# Patient Record
Sex: FEMALE | Race: WHITE
[De-identification: names, ages, dates, MRNs, and addresses within clinical notes are randomized per-mention and may not be internally consistent; named-entity substitution may affect disease eponyms.]

---

## 2017-12-16 ENCOUNTER — HOSPITAL ENCOUNTER (EMERGENCY)
Age: 37
Discharge: HOME | End: 2017-12-16
Payer: COMMERCIAL

## 2017-12-16 DIAGNOSIS — M54.30: ICD-10-CM

## 2017-12-16 DIAGNOSIS — Z88.0: ICD-10-CM

## 2017-12-16 DIAGNOSIS — J01.10: Primary | ICD-10-CM

## 2017-12-16 DIAGNOSIS — Z79.899: ICD-10-CM

## 2017-12-16 PROCEDURE — 99201: CPT

## 2018-06-21 ENCOUNTER — HOSPITAL ENCOUNTER (OUTPATIENT)
Age: 38
End: 2018-06-21
Payer: COMMERCIAL

## 2018-06-21 DIAGNOSIS — S69.92XA: Primary | ICD-10-CM

## 2018-06-21 PROCEDURE — 73130 X-RAY EXAM OF HAND: CPT

## 2018-07-09 ENCOUNTER — HOSPITAL ENCOUNTER (OUTPATIENT)
Age: 38
End: 2018-07-09
Payer: COMMERCIAL

## 2018-07-09 VITALS
RESPIRATION RATE: 18 BRPM | DIASTOLIC BLOOD PRESSURE: 68 MMHG | OXYGEN SATURATION: 98 % | HEART RATE: 74 BPM | SYSTOLIC BLOOD PRESSURE: 110 MMHG

## 2018-07-09 DIAGNOSIS — G57.00: ICD-10-CM

## 2018-07-09 DIAGNOSIS — M46.1: Primary | ICD-10-CM

## 2018-07-09 PROCEDURE — 99202 OFFICE O/P NEW SF 15 MIN: CPT

## 2018-10-15 ENCOUNTER — HOSPITAL ENCOUNTER (OUTPATIENT)
Age: 38
End: 2018-10-15
Payer: COMMERCIAL

## 2018-10-15 VITALS
OXYGEN SATURATION: 98 % | DIASTOLIC BLOOD PRESSURE: 62 MMHG | HEART RATE: 70 BPM | SYSTOLIC BLOOD PRESSURE: 106 MMHG | RESPIRATION RATE: 18 BRPM

## 2018-10-15 VITALS — BODY MASS INDEX: 21.6 KG/M2

## 2018-10-15 DIAGNOSIS — G57.00: ICD-10-CM

## 2018-10-15 DIAGNOSIS — M46.1: Primary | ICD-10-CM

## 2018-10-15 DIAGNOSIS — M79.10: ICD-10-CM

## 2018-10-15 PROCEDURE — 99213 OFFICE O/P EST LOW 20 MIN: CPT

## 2019-03-02 ENCOUNTER — OFFICE VISIT (OUTPATIENT)
Dept: RETAIL CLINIC | Facility: CLINIC | Age: 39
End: 2019-03-02

## 2019-03-02 VITALS
BODY MASS INDEX: 20.73 KG/M2 | SYSTOLIC BLOOD PRESSURE: 122 MMHG | DIASTOLIC BLOOD PRESSURE: 68 MMHG | HEIGHT: 68 IN | WEIGHT: 136.8 LBS | HEART RATE: 77 BPM | TEMPERATURE: 98.3 F | OXYGEN SATURATION: 98 % | RESPIRATION RATE: 20 BRPM

## 2019-03-02 DIAGNOSIS — H65.91 RIGHT OTITIS MEDIA WITH EFFUSION: Primary | ICD-10-CM

## 2019-03-02 DIAGNOSIS — H60.501 ACUTE OTITIS EXTERNA OF RIGHT EAR, UNSPECIFIED TYPE: ICD-10-CM

## 2019-03-02 DIAGNOSIS — J02.9 SORE THROAT: ICD-10-CM

## 2019-03-02 LAB
EXPIRATION DATE: NORMAL
INTERNAL CONTROL: NORMAL
Lab: NORMAL
S PYO AG THROAT QL: NEGATIVE

## 2019-03-02 PROCEDURE — 99203 OFFICE O/P NEW LOW 30 MIN: CPT | Performed by: NURSE PRACTITIONER

## 2019-03-02 PROCEDURE — 87880 STREP A ASSAY W/OPTIC: CPT | Performed by: NURSE PRACTITIONER

## 2019-03-02 RX ORDER — IBUPROFEN 200 MG
200 TABLET ORAL EVERY 6 HOURS PRN
COMMUNITY

## 2019-03-02 RX ORDER — FLUCONAZOLE 100 MG/1
100 TABLET ORAL DAILY
Qty: 2 TABLET | Refills: 0 | Status: SHIPPED | OUTPATIENT
Start: 2019-03-02 | End: 2019-03-04

## 2019-03-02 RX ORDER — CEFDINIR 300 MG/1
300 CAPSULE ORAL 2 TIMES DAILY
Qty: 20 CAPSULE | Refills: 0 | Status: SHIPPED | OUTPATIENT
Start: 2019-03-02 | End: 2019-03-12

## 2019-03-02 RX ORDER — OFLOXACIN 3 MG/ML
1 SOLUTION/ DROPS OPHTHALMIC 4 TIMES DAILY
Qty: 1 ML | Refills: 0 | Status: SHIPPED | OUTPATIENT
Start: 2019-03-02 | End: 2019-03-07

## 2019-03-02 NOTE — PROGRESS NOTES
ROSSI@  Tierney Clements is a 38 y.o. female.   Chief Complaint   Patient presents with   • Sore Throat      Tierney has had an earache & a feeling that her ear is full of wax for over a week.  In the past 2 days, she has developed a sore throat. She has had recurrent strep throat in the past that was resistant to azithromycin treatment.        Earache    There is pain in the right ear. This is a new problem. The problem occurs constantly. The problem has been gradually worsening. There has been no fever. Associated symptoms include headaches and a sore throat. Pertinent negatives include no abdominal pain, coughing, diarrhea, hearing loss, neck pain, rash, rhinorrhea or vomiting. Treatments tried: ibuprofen, sweet oil. The treatment provided no relief.        The following portions of the patient's history were reviewed and updated as appropriate: allergies, current medications, past family history, past medical history, past social history, past surgical history and problem list.    Current Outpatient Medications:   •  ibuprofen (ADVIL,MOTRIN) 200 MG tablet, Take 200 mg by mouth Every 6 (Six) Hours As Needed for Mild Pain ., Disp: , Rfl:   •  cefdinir (OMNICEF) 300 MG capsule, Take 1 capsule by mouth 2 (Two) Times a Day for 10 days., Disp: 20 capsule, Rfl: 0  •  fluconazole (DIFLUCAN) 100 MG tablet, Take 1 tablet by mouth Daily for 2 doses. Take 1st dose with 1 antibiotic, 2nd dose if symptoms persist after 72 hours, Disp: 2 tablet, Rfl: 0  •  ofloxacin (OCUFLOX) 0.3 %, Administer 1 drop to the right ear 4 (Four) Times a Day for 5 days., Disp: 1 mL, Rfl: 0    Allergies   Allergen Reactions   • Penicillins Rash       Review of Systems   Constitutional: Negative for appetite change, chills, fatigue and fever.   HENT: Positive for ear pain, sore throat and tinnitus. Negative for congestion, hearing loss, rhinorrhea, sinus pressure and sneezing.    Eyes: Negative for redness and itching.   Respiratory:  "Negative for cough and wheezing.    Cardiovascular: Negative for palpitations.   Gastrointestinal: Negative for abdominal pain, diarrhea, nausea and vomiting.   Musculoskeletal: Negative for myalgias and neck pain.   Skin: Negative for rash.   Neurological: Positive for headaches. Negative for dizziness.       Objective     Visit Vitals  /68   Pulse 77   Temp 98.3 °F (36.8 °C) (Oral)   Resp 20   Ht 172.7 cm (68\")   Wt 62.1 kg (136 lb 12.8 oz)   LMP 02/15/2019   SpO2 98%   BMI 20.80 kg/m²         Physical Exam   Constitutional: She is oriented to person, place, and time. She appears well-developed and well-nourished. She does not appear ill. No distress.   HENT:   Head: Normocephalic.   Right Ear: External ear normal. There is drainage and tenderness. Tympanic membrane is erythematous and bulging. A middle ear effusion is present.   Left Ear: Tympanic membrane, external ear and ear canal normal.   Nose: Nose normal.   Mouth/Throat: Uvula is midline and mucous membranes are normal. Posterior oropharyngeal erythema present. Tonsils are 0 on the right. Tonsils are 0 on the left. No tonsillar exudate.   Right ear canal erythematous with creamy drainage evident   Eyes: Conjunctivae are normal.   Cardiovascular: Normal rate, regular rhythm and normal heart sounds.   Pulmonary/Chest: Effort normal and breath sounds normal.   Lymphadenopathy:     She has no cervical adenopathy.   Neurological: She is alert and oriented to person, place, and time.       Lab Results (last 24 hours)     Procedure Component Value Units Date/Time    POC Rapid Strep A [[id number]]  (Normal) Collected:  03/02/19 1614    Specimen:  Swab Updated:  03/02/19 1615     Rapid Strep A Screen Negative     Internal Control Passed     Lot Number [id number]     Expiration Date 08/31/20          Assessment/Plan   Tierney was seen today for sore throat.    Diagnoses and all orders for this visit:    Right otitis media with effusion  -     cefdinir (OMNICEF) " 300 MG capsule; Take 1 capsule by mouth 2 (Two) Times a Day for 10 days.  - Drink plenty of clear, decaffeinated fluids, as tolerated.  - Acetaminophen or ibuprofen, per package directions, as needed for earache, sore throat, fever > 100, headache, mild pain  - Warm compress to ear(s) as needed for earache    Sore throat  -     POC Rapid Strep A  - Warm salt water gargle as needed for sore throat    Acute otitis externa of right ear, unspecified type  -     ofloxacin (OCUFLOX) 0.3 %; Administer 1 drop to the right ear 4 (Four) Times a Day for 5 days.    Other orders  -     fluconazole (DIFLUCAN) 100 MG tablet; Take 1 tablet by mouth Daily for 2 doses. Take 1st dose with 1 antibiotic, 2nd dose if symptoms persist after 72 hours    An After Visit Summary was reviewed, printed and given to the patient.  Understanding verbalized and patient agreed with treatment plan.  If symptom(s) worsen or fail to improve, return to clinic, follow up with PCP or go to [facility].

## 2019-08-02 ENCOUNTER — OFFICE (OUTPATIENT)
Dept: URBAN - METROPOLITAN AREA CLINIC 4 | Facility: CLINIC | Age: 39
End: 2019-08-02

## 2019-08-02 VITALS — DIASTOLIC BLOOD PRESSURE: 69 MMHG | SYSTOLIC BLOOD PRESSURE: 107 MMHG | HEIGHT: 68 IN | WEIGHT: 149 LBS

## 2019-08-02 DIAGNOSIS — R14.3 FLATULENCE: ICD-10-CM

## 2019-08-02 DIAGNOSIS — R11.0 NAUSEA: ICD-10-CM

## 2019-08-02 DIAGNOSIS — R19.4 CHANGE IN BOWEL HABIT: ICD-10-CM

## 2019-08-02 DIAGNOSIS — K30 FUNCTIONAL DYSPEPSIA: ICD-10-CM

## 2019-08-02 PROCEDURE — 99242 OFF/OP CONSLTJ NEW/EST SF 20: CPT | Performed by: NURSE PRACTITIONER

## 2021-06-24 ENCOUNTER — TRANSCRIBE ORDERS (OUTPATIENT)
Dept: ADMINISTRATIVE | Facility: HOSPITAL | Age: 41
End: 2021-06-24

## 2021-06-24 DIAGNOSIS — N80.30 ENDOMETRIOSIS OF PELVIC PERITONEUM: Primary | ICD-10-CM

## 2021-07-01 ENCOUNTER — HOSPITAL ENCOUNTER (OUTPATIENT)
Dept: MRI IMAGING | Facility: HOSPITAL | Age: 41
Discharge: HOME | End: 2021-07-01
Admitting: OBSTETRICS & GYNECOLOGY

## 2021-07-01 DIAGNOSIS — N80.30 ENDOMETRIOSIS OF PELVIC PERITONEUM: ICD-10-CM

## 2021-07-01 PROCEDURE — 0 GADOBENATE DIMEGLUMINE 529 MG/ML SOLUTION: Performed by: OBSTETRICS & GYNECOLOGY

## 2021-07-01 PROCEDURE — A9577 INJ MULTIHANCE: HCPCS | Performed by: OBSTETRICS & GYNECOLOGY

## 2021-07-01 PROCEDURE — 72197 MRI PELVIS W/O & W/DYE: CPT

## 2021-07-01 RX ADMIN — GADOBENATE DIMEGLUMINE 15 ML: 529 INJECTION, SOLUTION INTRAVENOUS at 11:55
